# Patient Record
Sex: MALE | Race: WHITE | NOT HISPANIC OR LATINO | URBAN - METROPOLITAN AREA
[De-identification: names, ages, dates, MRNs, and addresses within clinical notes are randomized per-mention and may not be internally consistent; named-entity substitution may affect disease eponyms.]

---

## 2017-09-22 ENCOUNTER — IMPORTED ENCOUNTER (OUTPATIENT)
Dept: URBAN - METROPOLITAN AREA CLINIC 50 | Facility: CLINIC | Age: 67
End: 2017-09-22

## 2017-10-05 ENCOUNTER — IMPORTED ENCOUNTER (OUTPATIENT)
Dept: URBAN - METROPOLITAN AREA CLINIC 50 | Facility: CLINIC | Age: 67
End: 2017-10-05

## 2017-10-09 ENCOUNTER — IMPORTED ENCOUNTER (OUTPATIENT)
Dept: URBAN - METROPOLITAN AREA CLINIC 50 | Facility: CLINIC | Age: 67
End: 2017-10-09

## 2017-10-18 ENCOUNTER — IMPORTED ENCOUNTER (OUTPATIENT)
Dept: URBAN - METROPOLITAN AREA CLINIC 50 | Facility: CLINIC | Age: 67
End: 2017-10-18

## 2017-10-18 NOTE — PATIENT DISCUSSION
"""S/P IOL OS: Acrysof SN6AT3 18.5 (ORA) @ 87Âº (Target: Distance) +ORA/LenSx/Arcs +TM/Omidria. Continue post operative instructions and drops per schedule.  """

## 2017-10-27 ENCOUNTER — IMPORTED ENCOUNTER (OUTPATIENT)
Dept: URBAN - METROPOLITAN AREA CLINIC 50 | Facility: CLINIC | Age: 67
End: 2017-10-27

## 2017-11-01 ENCOUNTER — IMPORTED ENCOUNTER (OUTPATIENT)
Dept: URBAN - METROPOLITAN AREA CLINIC 50 | Facility: CLINIC | Age: 67
End: 2017-11-01

## 2017-11-01 NOTE — PATIENT DISCUSSION
"""S/P IOL OD: Acrysof SN6AT4 19.0 @ 84Âº +LenSx +TM/Omidria. Continue post operative instructions and drops per schedule.  """

## 2017-11-10 ENCOUNTER — IMPORTED ENCOUNTER (OUTPATIENT)
Dept: URBAN - METROPOLITAN AREA CLINIC 50 | Facility: CLINIC | Age: 67
End: 2017-11-10

## 2017-11-20 ENCOUNTER — IMPORTED ENCOUNTER (OUTPATIENT)
Dept: URBAN - METROPOLITAN AREA CLINIC 50 | Facility: CLINIC | Age: 67
End: 2017-11-20

## 2017-11-20 NOTE — PATIENT DISCUSSION
"""S/P IOL OU: OD: Acrysof SN6AT4 19.0 @ 84ÂºLenSx +TMOmidria. OS: Acrysof SN6AT3 18.5 (ORA) @ 87Âº (Target: Distance) +ORA/Arcs +TM. "

## 2018-04-27 ENCOUNTER — IMPORTED ENCOUNTER (OUTPATIENT)
Dept: URBAN - METROPOLITAN AREA CLINIC 50 | Facility: CLINIC | Age: 68
End: 2018-04-27

## 2021-04-17 ASSESSMENT — VISUAL ACUITY
OD_SC: 20/20-1
OS_SC: 20/20
OD_PH: 20/25
OD_BAT: 20/70
OD_CC: J1
OD_CC: J3@ 17 IN
OD_OTHER: 20/70. 20/200.
OD_SC: 20/40
OD_BAT: 20/70
OS_CC: 20/40-
OD_OTHER: 20/70. 20/200.
OS_SC: 20/400
OD_PH: 20/30
OD_SC: 20/50-
OS_PH: 20/100
OS_SC: 20/20-
OD_PH: @ 17 IN
OS_SC: 20/400
OS_CC: J3@ 17 IN
OD_BAT: 20/70
OS_OTHER: 20/400. 20/400.
OS_BAT: 20/400
OD_SC: 20/25-3
OS_CC: J1
OS_PH: @ 17 IN
OS_SC: 20/25-2
OS_CC: J1
OS_SC: 20/20-2
OS_BAT: 20/400
OD_SC: 20/20
OD_SC: 20/50
OS_PH: @ 16 IN
OD_SC: 20/20-1
OD_OTHER: 20/70. 20/200.
OD_CC: J1

## 2021-04-17 ASSESSMENT — TONOMETRY
OD_IOP_MMHG: 19
OS_IOP_MMHG: 15
OS_IOP_MMHG: 14
OD_IOP_MMHG: 16
OS_IOP_MMHG: 18
OD_IOP_MMHG: 14
OD_IOP_MMHG: 15
OS_IOP_MMHG: 16
OS_IOP_MMHG: 13
OD_IOP_MMHG: 13
OS_IOP_MMHG: 16
OD_IOP_MMHG: 14
OD_IOP_MMHG: 25
OS_IOP_MMHG: 12